# Patient Record
Sex: MALE | Race: BLACK OR AFRICAN AMERICAN | NOT HISPANIC OR LATINO | URBAN - METROPOLITAN AREA
[De-identification: names, ages, dates, MRNs, and addresses within clinical notes are randomized per-mention and may not be internally consistent; named-entity substitution may affect disease eponyms.]

---

## 2021-05-21 ENCOUNTER — EMERGENCY (EMERGENCY)
Facility: HOSPITAL | Age: 41
LOS: 1 days | Discharge: HOME | End: 2021-05-21
Attending: EMERGENCY MEDICINE | Admitting: EMERGENCY MEDICINE
Payer: MEDICAID

## 2021-05-21 VITALS
RESPIRATION RATE: 16 BRPM | OXYGEN SATURATION: 98 % | HEIGHT: 69 IN | HEART RATE: 99 BPM | TEMPERATURE: 98 F | SYSTOLIC BLOOD PRESSURE: 117 MMHG | DIASTOLIC BLOOD PRESSURE: 79 MMHG

## 2021-05-21 PROCEDURE — 99284 EMERGENCY DEPT VISIT MOD MDM: CPT

## 2021-05-21 RX ORDER — PANTOPRAZOLE SODIUM 20 MG/1
80 TABLET, DELAYED RELEASE ORAL ONCE
Refills: 0 | Status: COMPLETED | OUTPATIENT
Start: 2021-05-21 | End: 2021-05-21

## 2021-05-21 RX ORDER — SODIUM CHLORIDE 9 MG/ML
1000 INJECTION INTRAMUSCULAR; INTRAVENOUS; SUBCUTANEOUS ONCE
Refills: 0 | Status: COMPLETED | OUTPATIENT
Start: 2021-05-21 | End: 2021-05-21

## 2021-05-21 RX ADMIN — SODIUM CHLORIDE 1000 MILLILITER(S): 9 INJECTION INTRAMUSCULAR; INTRAVENOUS; SUBCUTANEOUS at 23:37

## 2021-05-21 RX ADMIN — PANTOPRAZOLE SODIUM 80 MILLIGRAM(S): 20 TABLET, DELAYED RELEASE ORAL at 23:37

## 2021-05-21 NOTE — ED PROVIDER NOTE - PATIENT PORTAL LINK FT
You can access the FollowMyHealth Patient Portal offered by [facility] by registering at the following website: [email or web address]. By joining [employer]’s FollowMyHealth portal, you will also be able to view your health information using other applications (apps) compatible with our system.

## 2021-05-21 NOTE — ED ADULT TRIAGE NOTE - CHIEF COMPLAINT QUOTE
patient complaining of shortness of breath, pain to mid chest and generalized abdominal pain x 1 week with nausea and vomiting. was recently in ED, had echo stress. patient failed stress test and was unable to run more than 6 minutes so was sent for cath where they did not find anything abnormal. patient continue to have same symptoms that is unresolved. no sick contacts, had covid test x 3 days ago which was negative. patient complaining of shortness of breath, pain to mid chest and generalized abdominal pain x 1 week with nausea and vomiting. was recently in ED, had echo stress. patient failed stress test and was unable to run more than 6 minutes so was sent for cath where they did not find anything abnormal. patient continue to have same symptoms that is unresolved. no sick contacts, had covid test x 3 days ago which was negative.     patient went to bathroom in ED, vomited, had some blood tinge vomit in toilet.

## 2021-05-21 NOTE — ED ADULT NURSE NOTE - CHIEF COMPLAINT QUOTE
patient complaining of shortness of breath, pain to mid chest and generalized abdominal pain x 1 week with nausea and vomiting. was recently in ED, had echo stress. patient failed stress test and was unable to run more than 6 minutes so was sent for cath where they did not find anything abnormal. patient continue to have same symptoms that is unresolved. no sick contacts, had covid test x 3 days ago which was negative.     patient went to bathroom in ED, vomited, had some blood tinge vomit in toilet.

## 2021-05-21 NOTE — ED PROVIDER NOTE - CLINICAL SUMMARY MEDICAL DECISION MAKING FREE TEXT BOX
40M w n/v, now w/ hematemesis, sob, s/p negative echo and cath 2 days ago, hyperglycemic, r/o dka, no abdominal tenderness, will hold ct for now, tba for hematemesis and GI evaluation, has not vomited in room here, vss, no cirrhotic features or h/o etoh abuse as per patient

## 2021-05-21 NOTE — ED PROVIDER NOTE - PROGRESS NOTE DETAILS
Ford, PGY2 - pt persistently nauseas, IVF and reglan ordered Ford, PGY2 - pt reassessed, states feels better denies nausea or further vomiting. discussed results, plan for f/u PMD continue metformin as prescribed pt verbalized understanding, agrees with plan, all questions answered

## 2021-05-21 NOTE — ED ADULT NURSE NOTE - OBJECTIVE STATEMENT
Pt received in room 4. Pt A&Ox4, pmhx HTN, DM newly on metformin, c/o SOB, chest heaviness, generalized abd pain, nausea and multiple episodes of vomiting. Pt states he was recently admitted to the hospital for similar symptoms and had a cardiac cath which was negative. Pt states since he has been discharged he has had worsening SOB on exertion, and nausea with vomiting (possibly some blood). Pt denies any ha, dizziness, diarrhea, fevers/chills. Respirations even and unlabored, no accessory muscle use, pt able to speak in full sentences. Pt in normal sinus rhythm on cardiac monitor. 20G placed to L AC, labs sent. Stretcher in lowest position, side rails up, call bell within reach.

## 2021-05-21 NOTE — ED PROVIDER NOTE - NSFOLLOWUPCLINICS_GEN_ALL_ED_FT
A Gastroenterologist  Gastroenterology  .  [place]   Phone:   Fax:     [facility] - Primary Care  Primary Care  [address] [place] [address]  Phone: [phone]  Fax:     [facility]  Gastroenterology  [address], [address]  [place] [address]  Phone: [phone]  Fax:

## 2021-05-21 NOTE — ED PROVIDER NOTE - NS ED ROS FT
General: denies fever, chills  HENT: denies nasal congestion, rhinorrhea  CV: denies chest pain, palpitations  Resp: + difficulty breathing, denies cough  Abdominal: + nausea, + vomiting, + hematemesis   MSK: denies muscle aches, leg swelling  Neuro: denies headaches, numbness, tingling  Skin: denies rashes, bruises  Heme: denies petechia, abnormal bleeding

## 2021-05-21 NOTE — ED PROVIDER NOTE - OBJECTIVE STATEMENT
40M w/ h/o T2DM on metformin presents w/ 1 week of nausea, sob, vomiting. States he went to an OSH where he was unable to tolerate a stress test but had a negative cath and echo. States symptoms have persisted prompting ED visit. States he has been vomiting blood today x3. Denies h/o etoh use or cirrhosis. Denies rectal bleed or dark stools. denies h/o dka

## 2021-05-21 NOTE — ED PROVIDER NOTE - NSFOLLOWUPINSTRUCTIONS_ED_ALL_ED_FT
Rest and stay well hydrated.   Continue your metformin as prescribed.   Take reglan 10mg every 8 hours as needed for nausea / vomiting.   Follow-up with your PMD in 3-5 days for recheck.   Return to the ED for any further vomiting, fever, difficulty breathing, chest pain, abdominal pain or any new concerning symptoms.

## 2021-05-21 NOTE — ED PROVIDER NOTE - ATTENDING CONTRIBUTION TO CARE
I have seen and examined the patient on the patient´s visit date. I have reviewed the note written by Albaro Bautista MD, on that visit day. I have supervised and participated as necessary in the performance of procedures indicated for patient management and was available at all phases of the patient´s visit when needed. We discussed the history, physical exam findings, management plan, and  medical decision making. I have made my additions, exceptions, and revisions within the chart and I agree with H and P as documented in its entirety. The data and my interpretation of any data collected from labs, interventions and imaging appear below as well as my independent medical decision making and considerations.    The patient is a 40y Male who has a past medical and surgery history of  DM/DKA Hypertension PTED with Chest pain nausea and vomiting as described      PTED with   Vital Signs Last 24 Hrs  T(F): 98.4 HR: 96 BP: 137/91 RR: 16 SpO2: 98% (21 May 2021 22:04)   PE: as described; my additions and exceptions are noted in the chart    DATA:  EKG: pending at time of evaluation  LAB: Pending at time of evaluation            IMPRESSION/RISK:  Dx=  Differential includes but not limited to conditions listed in order of most possible first:   Consideration include  Plan I have seen and examined the patient on the patient´s visit date. I have reviewed the note written by Albaro Bautista MD, on that visit day. I have supervised and participated as necessary in the performance of procedures indicated for patient management and was available at all phases of the patient´s visit when needed. We discussed the history, physical exam findings, management plan, and  medical decision making. I have made my additions, exceptions, and revisions within the chart and I agree with H and P as documented in its entirety. The data and my interpretation of any data collected from labs, interventions and imaging appear below as well as my independent medical decision making and considerations.    The patient is a 40y Male who has a past medical and surgery history of  DM/DKA Hypertension PTED with Chest pain nausea and vomiting as described    Vital Signs Last 24 Hrs  T(F): 98.4 HR: 96 BP: 137/91 RR: 16 SpO2: 98% (21 May 2021 22:04)   PE: as described; my additions and exceptions are noted in the chart    DATA:  EKG: pending at time of evaluation  LAB: Pending at time of evaluation      IMPRESSION/RISK:  Dx= negative cardiac workup in diabetic patient  Consideration include doubt PE; most probably gastroparesis dka unlikely given patients benign clinical exam and no stigmata of infection; pattern of sporadic symptoms makes it unikely as well  Plan  trop  ekg  labs   symptomatic tx reglan and H2/ppi   if response favorable d/c with same

## 2021-05-22 VITALS
SYSTOLIC BLOOD PRESSURE: 142 MMHG | HEART RATE: 94 BPM | RESPIRATION RATE: 18 BRPM | DIASTOLIC BLOOD PRESSURE: 96 MMHG | OXYGEN SATURATION: 100 %

## 2021-05-22 LAB
ALBUMIN SERPL ELPH-MCNC: 4.3 G/DL — SIGNIFICANT CHANGE UP (ref 3.3–5)
ALP SERPL-CCNC: 64 U/L — SIGNIFICANT CHANGE UP (ref 40–120)
ALT FLD-CCNC: 11 U/L — SIGNIFICANT CHANGE UP (ref 4–41)
ANION GAP SERPL CALC-SCNC: 13 MMOL/L — SIGNIFICANT CHANGE UP (ref 7–14)
APPEARANCE UR: CLEAR — SIGNIFICANT CHANGE UP
APTT BLD: 32.8 SEC — SIGNIFICANT CHANGE UP (ref 27–36.3)
AST SERPL-CCNC: 15 U/L — SIGNIFICANT CHANGE UP (ref 4–40)
B-OH-BUTYR SERPL-SCNC: 1.9 MMOL/L — HIGH (ref 0–0.4)
BASOPHILS # BLD AUTO: 0.02 K/UL — SIGNIFICANT CHANGE UP (ref 0–0.2)
BASOPHILS NFR BLD AUTO: 0.2 % — SIGNIFICANT CHANGE UP (ref 0–2)
BILIRUB SERPL-MCNC: 0.6 MG/DL — SIGNIFICANT CHANGE UP (ref 0.2–1.2)
BILIRUB UR-MCNC: NEGATIVE — SIGNIFICANT CHANGE UP
BLD GP AB SCN SERPL QL: NEGATIVE — SIGNIFICANT CHANGE UP
BLOOD GAS VENOUS COMPREHENSIVE RESULT: SIGNIFICANT CHANGE UP
BLOOD GAS VENOUS COMPREHENSIVE RESULT: SIGNIFICANT CHANGE UP
BUN SERPL-MCNC: 13 MG/DL — SIGNIFICANT CHANGE UP (ref 7–23)
CALCIUM SERPL-MCNC: 10 MG/DL — SIGNIFICANT CHANGE UP (ref 8.4–10.5)
CHLORIDE SERPL-SCNC: 98 MMOL/L — SIGNIFICANT CHANGE UP (ref 98–107)
CO2 SERPL-SCNC: 25 MMOL/L — SIGNIFICANT CHANGE UP (ref 22–31)
COLOR SPEC: SIGNIFICANT CHANGE UP
CREAT SERPL-MCNC: 0.87 MG/DL — SIGNIFICANT CHANGE UP (ref 0.5–1.3)
DIFF PNL FLD: NEGATIVE — SIGNIFICANT CHANGE UP
EOSINOPHIL # BLD AUTO: 0.02 K/UL — SIGNIFICANT CHANGE UP (ref 0–0.5)
EOSINOPHIL NFR BLD AUTO: 0.2 % — SIGNIFICANT CHANGE UP (ref 0–6)
GLUCOSE SERPL-MCNC: 287 MG/DL — HIGH (ref 70–99)
GLUCOSE UR QL: ABNORMAL
HCT VFR BLD CALC: 37.4 % — LOW (ref 39–50)
HGB BLD-MCNC: 12 G/DL — LOW (ref 13–17)
IANC: 7.27 K/UL — SIGNIFICANT CHANGE UP (ref 1.5–8.5)
IMM GRANULOCYTES NFR BLD AUTO: 0.3 % — SIGNIFICANT CHANGE UP (ref 0–1.5)
INR BLD: 1.11 RATIO — SIGNIFICANT CHANGE UP (ref 0.88–1.16)
KETONES UR-MCNC: ABNORMAL
LEUKOCYTE ESTERASE UR-ACNC: NEGATIVE — SIGNIFICANT CHANGE UP
LYMPHOCYTES # BLD AUTO: 19.9 % — SIGNIFICANT CHANGE UP (ref 13–44)
LYMPHOCYTES # BLD AUTO: 2.03 K/UL — SIGNIFICANT CHANGE UP (ref 1–3.3)
MCHC RBC-ENTMCNC: 25.5 PG — LOW (ref 27–34)
MCHC RBC-ENTMCNC: 32.1 GM/DL — SIGNIFICANT CHANGE UP (ref 32–36)
MCV RBC AUTO: 79.4 FL — LOW (ref 80–100)
MONOCYTES # BLD AUTO: 0.84 K/UL — SIGNIFICANT CHANGE UP (ref 0–0.9)
MONOCYTES NFR BLD AUTO: 8.2 % — SIGNIFICANT CHANGE UP (ref 2–14)
NEUTROPHILS # BLD AUTO: 7.27 K/UL — SIGNIFICANT CHANGE UP (ref 1.8–7.4)
NEUTROPHILS NFR BLD AUTO: 71.2 % — SIGNIFICANT CHANGE UP (ref 43–77)
NITRITE UR-MCNC: NEGATIVE — SIGNIFICANT CHANGE UP
NRBC # BLD: 0 /100 WBCS — SIGNIFICANT CHANGE UP
NRBC # FLD: 0 K/UL — SIGNIFICANT CHANGE UP
PH UR: 7 — SIGNIFICANT CHANGE UP (ref 5–8)
PLATELET # BLD AUTO: 330 K/UL — SIGNIFICANT CHANGE UP (ref 150–400)
POTASSIUM SERPL-MCNC: 5.1 MMOL/L — SIGNIFICANT CHANGE UP (ref 3.5–5.3)
POTASSIUM SERPL-SCNC: 5.1 MMOL/L — SIGNIFICANT CHANGE UP (ref 3.5–5.3)
PROT SERPL-MCNC: 7.3 G/DL — SIGNIFICANT CHANGE UP (ref 6–8.3)
PROT UR-MCNC: ABNORMAL
PROTHROM AB SERPL-ACNC: 12.6 SEC — SIGNIFICANT CHANGE UP (ref 10.6–13.6)
RBC # BLD: 4.71 M/UL — SIGNIFICANT CHANGE UP (ref 4.2–5.8)
RBC # FLD: 12.5 % — SIGNIFICANT CHANGE UP (ref 10.3–14.5)
RH IG SCN BLD-IMP: POSITIVE — SIGNIFICANT CHANGE UP
SARS-COV-2 RNA SPEC QL NAA+PROBE: SIGNIFICANT CHANGE UP
SODIUM SERPL-SCNC: 136 MMOL/L — SIGNIFICANT CHANGE UP (ref 135–145)
SP GR SPEC: 1.02 — SIGNIFICANT CHANGE UP (ref 1.01–1.02)
UROBILINOGEN FLD QL: SIGNIFICANT CHANGE UP
WBC # BLD: 10.21 K/UL — SIGNIFICANT CHANGE UP (ref 3.8–10.5)
WBC # FLD AUTO: 10.21 K/UL — SIGNIFICANT CHANGE UP (ref 3.8–10.5)

## 2021-05-22 PROCEDURE — 71046 X-RAY EXAM CHEST 2 VIEWS: CPT | Mod: 26

## 2021-05-22 RX ORDER — SODIUM CHLORIDE 9 MG/ML
1000 INJECTION INTRAMUSCULAR; INTRAVENOUS; SUBCUTANEOUS ONCE
Refills: 0 | Status: COMPLETED | OUTPATIENT
Start: 2021-05-22 | End: 2021-05-22

## 2021-05-22 RX ORDER — ONDANSETRON 8 MG/1
4 TABLET, FILM COATED ORAL ONCE
Refills: 0 | Status: COMPLETED | OUTPATIENT
Start: 2021-05-22 | End: 2021-05-22

## 2021-05-22 RX ORDER — METOCLOPRAMIDE HCL 10 MG
10 TABLET ORAL ONCE
Refills: 0 | Status: COMPLETED | OUTPATIENT
Start: 2021-05-22 | End: 2021-05-22

## 2021-05-22 RX ADMIN — Medication 10 MILLIGRAM(S): at 02:11

## 2021-05-22 RX ADMIN — ONDANSETRON 4 MILLIGRAM(S): 8 TABLET, FILM COATED ORAL at 01:54

## 2021-05-22 RX ADMIN — SODIUM CHLORIDE 1000 MILLILITER(S): 9 INJECTION INTRAMUSCULAR; INTRAVENOUS; SUBCUTANEOUS at 02:11

## 2021-05-23 LAB
CULTURE RESULTS: SIGNIFICANT CHANGE UP
SPECIMEN SOURCE: SIGNIFICANT CHANGE UP

## 2023-01-10 NOTE — ED PROVIDER NOTE - PMH
Nutrition Note      Consult for HF diet education. Pt unavailable during attempted RD visit. Will put HF Diet Education Materials in pt's discharge notes. Will follow-up at LOS if  still inpatient.         Electronically signed by Clement Pang RD on 1/10/23 at 3:47 PM EST    Contact: Clement Pang, [address]:    [place]: [phone]  Office:  [phone] Hypertension

## 2025-06-15 ENCOUNTER — INPATIENT (INPATIENT)
Facility: HOSPITAL | Age: 45
LOS: 2 days | Discharge: HOME | End: 2025-06-18
Attending: STUDENT IN AN ORGANIZED HEALTH CARE EDUCATION/TRAINING PROGRAM | Admitting: STUDENT IN AN ORGANIZED HEALTH CARE EDUCATION/TRAINING PROGRAM
Payer: COMMERCIAL

## 2025-06-15 VITALS
HEIGHT: 68 IN | TEMPERATURE: 98 F | SYSTOLIC BLOOD PRESSURE: 99 MMHG | RESPIRATION RATE: 16 BRPM | HEART RATE: 99 BPM | OXYGEN SATURATION: 100 % | DIASTOLIC BLOOD PRESSURE: 79 MMHG | WEIGHT: 210.1 LBS

## 2025-06-15 LAB
ALBUMIN SERPL ELPH-MCNC: 3.6 G/DL — SIGNIFICANT CHANGE UP (ref 3.3–5)
ALP SERPL-CCNC: 87 U/L — SIGNIFICANT CHANGE UP (ref 40–120)
ALT FLD-CCNC: 19 U/L — SIGNIFICANT CHANGE UP (ref 12–78)
ANION GAP SERPL CALC-SCNC: 11 MMOL/L — SIGNIFICANT CHANGE UP (ref 5–17)
ANION GAP SERPL CALC-SCNC: 17 MMOL/L — SIGNIFICANT CHANGE UP (ref 5–17)
ANION GAP SERPL CALC-SCNC: 18 MMOL/L — HIGH (ref 5–17)
APPEARANCE UR: CLEAR — SIGNIFICANT CHANGE UP
APTT BLD: 27 SEC — SIGNIFICANT CHANGE UP (ref 26.1–36.8)
AST SERPL-CCNC: 12 U/L — LOW (ref 15–37)
BACTERIA # UR AUTO: NEGATIVE /HPF — SIGNIFICANT CHANGE UP
BASE EXCESS BLDV CALC-SCNC: -10 MMOL/L — LOW (ref -2–3)
BASE EXCESS BLDV CALC-SCNC: -7.7 MMOL/L — LOW (ref -2–3)
BASOPHILS # BLD AUTO: 0.02 K/UL — SIGNIFICANT CHANGE UP (ref 0–0.2)
BASOPHILS NFR BLD AUTO: 0.1 % — SIGNIFICANT CHANGE UP (ref 0–2)
BILIRUB SERPL-MCNC: 1.3 MG/DL — HIGH (ref 0.2–1.2)
BILIRUB UR-MCNC: NEGATIVE — SIGNIFICANT CHANGE UP
BLOOD GAS COMMENTS, VENOUS: SIGNIFICANT CHANGE UP
BLOOD GAS COMMENTS, VENOUS: SIGNIFICANT CHANGE UP
BUN SERPL-MCNC: 42 MG/DL — HIGH (ref 7–23)
BUN SERPL-MCNC: 49 MG/DL — HIGH (ref 7–23)
BUN SERPL-MCNC: 55 MG/DL — HIGH (ref 7–23)
CALCIUM SERPL-MCNC: 8.2 MG/DL — LOW (ref 8.5–10.1)
CALCIUM SERPL-MCNC: 8.7 MG/DL — SIGNIFICANT CHANGE UP (ref 8.5–10.1)
CALCIUM SERPL-MCNC: 9 MG/DL — SIGNIFICANT CHANGE UP (ref 8.5–10.1)
CHLORIDE BLDV-SCNC: 108 MMOL/L — HIGH (ref 98–107)
CHLORIDE BLDV-SCNC: 86 MMOL/L — LOW (ref 98–107)
CHLORIDE SERPL-SCNC: 100 MMOL/L — SIGNIFICANT CHANGE UP (ref 96–108)
CHLORIDE SERPL-SCNC: 89 MMOL/L — LOW (ref 96–108)
CHLORIDE SERPL-SCNC: 96 MMOL/L — SIGNIFICANT CHANGE UP (ref 96–108)
CO2 BLDV-SCNC: 17 MMOL/L — LOW (ref 22–26)
CO2 BLDV-SCNC: 17 MMOL/L — LOW (ref 22–26)
CO2 SERPL-SCNC: 15 MMOL/L — LOW (ref 22–31)
CO2 SERPL-SCNC: 16 MMOL/L — LOW (ref 22–31)
CO2 SERPL-SCNC: 22 MMOL/L — SIGNIFICANT CHANGE UP (ref 22–31)
COLOR SPEC: YELLOW — SIGNIFICANT CHANGE UP
CREAT SERPL-MCNC: 1.53 MG/DL — HIGH (ref 0.5–1.3)
CREAT SERPL-MCNC: 1.83 MG/DL — HIGH (ref 0.5–1.3)
CREAT SERPL-MCNC: 2.09 MG/DL — HIGH (ref 0.5–1.3)
DIFF PNL FLD: NEGATIVE — SIGNIFICANT CHANGE UP
EGFR: 40 ML/MIN/1.73M2 — LOW
EGFR: 40 ML/MIN/1.73M2 — LOW
EGFR: 47 ML/MIN/1.73M2 — LOW
EGFR: 47 ML/MIN/1.73M2 — LOW
EGFR: 58 ML/MIN/1.73M2 — LOW
EGFR: 58 ML/MIN/1.73M2 — LOW
EOSINOPHIL # BLD AUTO: 0.01 K/UL — SIGNIFICANT CHANGE UP (ref 0–0.5)
EOSINOPHIL NFR BLD AUTO: 0.1 % — SIGNIFICANT CHANGE UP (ref 0–6)
FLUAV AG NPH QL: SIGNIFICANT CHANGE UP
FLUBV AG NPH QL: SIGNIFICANT CHANGE UP
GAS PNL BLDV: 121 MMOL/L — LOW (ref 136–145)
GAS PNL BLDV: 134 MMOL/L — LOW (ref 136–145)
GAS PNL BLDV: SIGNIFICANT CHANGE UP
GLUCOSE BLDC GLUCOMTR-MCNC: 167 MG/DL — HIGH (ref 70–99)
GLUCOSE BLDC GLUCOMTR-MCNC: 208 MG/DL — HIGH (ref 70–99)
GLUCOSE BLDC GLUCOMTR-MCNC: 216 MG/DL — HIGH (ref 70–99)
GLUCOSE BLDC GLUCOMTR-MCNC: 226 MG/DL — HIGH (ref 70–99)
GLUCOSE BLDC GLUCOMTR-MCNC: 300 MG/DL — HIGH (ref 70–99)
GLUCOSE BLDC GLUCOMTR-MCNC: 343 MG/DL — HIGH (ref 70–99)
GLUCOSE BLDC GLUCOMTR-MCNC: 364 MG/DL — HIGH (ref 70–99)
GLUCOSE BLDV-MCNC: 144 MG/DL — HIGH (ref 65–95)
GLUCOSE BLDV-MCNC: SIGNIFICANT CHANGE UP MG/DL (ref 65–95)
GLUCOSE SERPL-MCNC: 183 MG/DL — HIGH (ref 70–99)
GLUCOSE SERPL-MCNC: 382 MG/DL — HIGH (ref 70–99)
GLUCOSE SERPL-MCNC: 589 MG/DL — CRITICAL HIGH (ref 70–99)
GLUCOSE UR QL: >=1000 MG/DL
HCO3 BLDV-SCNC: 16 MMOL/L — LOW (ref 22–28)
HCO3 BLDV-SCNC: 16 MMOL/L — LOW (ref 22–28)
HCT VFR BLD CALC: 48.8 % — SIGNIFICANT CHANGE UP (ref 39–50)
HCT VFR BLDA CALC: 40 % — SIGNIFICANT CHANGE UP (ref 37–47)
HCT VFR BLDA CALC: 48 % — HIGH (ref 37–47)
HGB BLD CALC-MCNC: 13.3 G/DL — SIGNIFICANT CHANGE UP (ref 12.6–17.4)
HGB BLD CALC-MCNC: 16 G/DL — SIGNIFICANT CHANGE UP (ref 12.6–17.4)
HGB BLD-MCNC: 15.7 G/DL — SIGNIFICANT CHANGE UP (ref 13–17)
HOROWITZ INDEX BLDV+IHG-RTO: 21 — SIGNIFICANT CHANGE UP
IMM GRANULOCYTES NFR BLD AUTO: 1.1 % — HIGH (ref 0–0.9)
INR BLD: 0.97 RATIO — SIGNIFICANT CHANGE UP (ref 0.85–1.16)
KETONES UR QL: 80 MG/DL
LACTATE BLDV-MCNC: 1.7 MMOL/L — HIGH (ref 0.56–1.39)
LACTATE BLDV-MCNC: 3.6 MMOL/L — HIGH (ref 0.56–1.39)
LACTATE SERPL-SCNC: 3.3 MMOL/L — HIGH (ref 0.7–2)
LACTATE SERPL-SCNC: 3.4 MMOL/L — HIGH (ref 0.7–2)
LEUKOCYTE ESTERASE UR-ACNC: NEGATIVE — SIGNIFICANT CHANGE UP
LIDOCAIN IGE QN: 26 U/L — SIGNIFICANT CHANGE UP (ref 13–75)
LYMPHOCYTES # BLD AUTO: 18.3 % — SIGNIFICANT CHANGE UP (ref 13–44)
LYMPHOCYTES # BLD AUTO: 2.55 K/UL — SIGNIFICANT CHANGE UP (ref 1–3.3)
MAGNESIUM SERPL-MCNC: 3.1 MG/DL — HIGH (ref 1.6–2.6)
MCHC RBC-ENTMCNC: 26.1 PG — LOW (ref 27–34)
MCHC RBC-ENTMCNC: 32.2 G/DL — SIGNIFICANT CHANGE UP (ref 32–36)
MCV RBC AUTO: 81.2 FL — SIGNIFICANT CHANGE UP (ref 80–100)
MONOCYTES # BLD AUTO: 0.93 K/UL — HIGH (ref 0–0.9)
MONOCYTES NFR BLD AUTO: 6.7 % — SIGNIFICANT CHANGE UP (ref 2–14)
NEUTROPHILS # BLD AUTO: 10.25 K/UL — HIGH (ref 1.8–7.4)
NEUTROPHILS NFR BLD AUTO: 73.7 % — SIGNIFICANT CHANGE UP (ref 43–77)
NITRITE UR-MCNC: NEGATIVE — SIGNIFICANT CHANGE UP
NRBC BLD AUTO-RTO: 0 /100 WBCS — SIGNIFICANT CHANGE UP (ref 0–0)
PCO2 BLDV: 28 MMHG — LOW (ref 42–55)
PCO2 BLDV: 36 MMHG — LOW (ref 42–55)
PH BLDV: 7.26 — LOW (ref 7.32–7.43)
PH BLDV: 7.37 — SIGNIFICANT CHANGE UP (ref 7.32–7.43)
PH UR: 6 — SIGNIFICANT CHANGE UP (ref 5–8)
PLATELET # BLD AUTO: 419 K/UL — HIGH (ref 150–400)
PO2 BLDV: 21 MMHG — LOW (ref 25–45)
PO2 BLDV: 26 MMHG — SIGNIFICANT CHANGE UP (ref 25–45)
POTASSIUM BLDV-SCNC: 2.7 MMOL/L — CRITICAL LOW (ref 3.5–5.1)
POTASSIUM BLDV-SCNC: 5.1 MMOL/L — SIGNIFICANT CHANGE UP (ref 3.5–5.1)
POTASSIUM SERPL-MCNC: 3.6 MMOL/L — SIGNIFICANT CHANGE UP (ref 3.5–5.3)
POTASSIUM SERPL-MCNC: 4.1 MMOL/L — SIGNIFICANT CHANGE UP (ref 3.5–5.3)
POTASSIUM SERPL-MCNC: 4.9 MMOL/L — SIGNIFICANT CHANGE UP (ref 3.5–5.3)
POTASSIUM SERPL-SCNC: 3.6 MMOL/L — SIGNIFICANT CHANGE UP (ref 3.5–5.3)
POTASSIUM SERPL-SCNC: 4.1 MMOL/L — SIGNIFICANT CHANGE UP (ref 3.5–5.3)
POTASSIUM SERPL-SCNC: 4.9 MMOL/L — SIGNIFICANT CHANGE UP (ref 3.5–5.3)
PROT SERPL-MCNC: 8.2 GM/DL — SIGNIFICANT CHANGE UP (ref 6–8.3)
PROT UR-MCNC: 30 MG/DL
PROTHROM AB SERPL-ACNC: 11.2 SEC — SIGNIFICANT CHANGE UP (ref 9.9–13.4)
RBC # BLD: 6.01 M/UL — HIGH (ref 4.2–5.8)
RBC # FLD: 13.2 % — SIGNIFICANT CHANGE UP (ref 10.3–14.5)
RBC CASTS # UR COMP ASSIST: 1 /HPF — SIGNIFICANT CHANGE UP (ref 0–4)
RSV RNA NPH QL NAA+NON-PROBE: SIGNIFICANT CHANGE UP
SAO2 % BLDV: 33.4 % — LOW (ref 94–98)
SAO2 % BLDV: 44.5 % — LOW (ref 94–98)
SARS-COV-2 RNA SPEC QL NAA+PROBE: SIGNIFICANT CHANGE UP
SODIUM SERPL-SCNC: 122 MMOL/L — LOW (ref 135–145)
SODIUM SERPL-SCNC: 129 MMOL/L — LOW (ref 135–145)
SODIUM SERPL-SCNC: 133 MMOL/L — LOW (ref 135–145)
SOURCE RESPIRATORY: SIGNIFICANT CHANGE UP
SP GR SPEC: >1.03 — HIGH (ref 1–1.03)
UROBILINOGEN FLD QL: 0.2 MG/DL — SIGNIFICANT CHANGE UP (ref 0.2–1)
WBC # BLD: 13.92 K/UL — HIGH (ref 3.8–10.5)
WBC # FLD AUTO: 13.92 K/UL — HIGH (ref 3.8–10.5)
WBC UR QL: 3 /HPF — SIGNIFICANT CHANGE UP (ref 0–5)

## 2025-06-15 PROCEDURE — 99223 1ST HOSP IP/OBS HIGH 75: CPT

## 2025-06-15 PROCEDURE — 93010 ELECTROCARDIOGRAM REPORT: CPT

## 2025-06-15 PROCEDURE — 74176 CT ABD & PELVIS W/O CONTRAST: CPT | Mod: 26

## 2025-06-15 PROCEDURE — 71045 X-RAY EXAM CHEST 1 VIEW: CPT | Mod: 26

## 2025-06-15 PROCEDURE — 99285 EMERGENCY DEPT VISIT HI MDM: CPT

## 2025-06-15 RX ORDER — ONDANSETRON HCL/PF 4 MG/2 ML
4 VIAL (ML) INJECTION ONCE
Refills: 0 | Status: COMPLETED | OUTPATIENT
Start: 2025-06-15 | End: 2025-06-15

## 2025-06-15 RX ORDER — SODIUM CHLORIDE 9 G/1000ML
1000 INJECTION, SOLUTION INTRAVENOUS ONCE
Refills: 0 | Status: COMPLETED | OUTPATIENT
Start: 2025-06-15 | End: 2025-06-15

## 2025-06-15 RX ORDER — INSULIN LISPRO 100 U/ML
INJECTION, SOLUTION INTRAVENOUS; SUBCUTANEOUS
Refills: 0 | Status: DISCONTINUED | OUTPATIENT
Start: 2025-06-15 | End: 2025-06-18

## 2025-06-15 RX ORDER — INSULIN LISPRO 100 U/ML
7 INJECTION, SOLUTION INTRAVENOUS; SUBCUTANEOUS
Refills: 0 | Status: DISCONTINUED | OUTPATIENT
Start: 2025-06-16 | End: 2025-06-16

## 2025-06-15 RX ORDER — INSULIN GLARGINE-YFGN 100 [IU]/ML
35 INJECTION, SOLUTION SUBCUTANEOUS ONCE
Refills: 0 | Status: COMPLETED | OUTPATIENT
Start: 2025-06-15 | End: 2025-06-15

## 2025-06-15 RX ORDER — ENOXAPARIN SODIUM 100 MG/ML
40 INJECTION SUBCUTANEOUS EVERY 24 HOURS
Refills: 0 | Status: DISCONTINUED | OUTPATIENT
Start: 2025-06-15 | End: 2025-06-18

## 2025-06-15 RX ORDER — INSULIN GLARGINE-YFGN 100 [IU]/ML
35 INJECTION, SOLUTION SUBCUTANEOUS AT BEDTIME
Refills: 0 | Status: DISCONTINUED | OUTPATIENT
Start: 2025-06-16 | End: 2025-06-17

## 2025-06-15 RX ORDER — SODIUM CHLORIDE 9 G/1000ML
2100 INJECTION, SOLUTION INTRAVENOUS ONCE
Refills: 0 | Status: COMPLETED | OUTPATIENT
Start: 2025-06-15 | End: 2025-06-15

## 2025-06-15 RX ADMIN — SODIUM CHLORIDE 1000 MILLILITER(S): 9 INJECTION, SOLUTION INTRAVENOUS at 13:48

## 2025-06-15 RX ADMIN — SODIUM CHLORIDE 1000 MILLILITER(S): 9 INJECTION, SOLUTION INTRAVENOUS at 10:22

## 2025-06-15 RX ADMIN — INSULIN LISPRO 1: 100 INJECTION, SOLUTION INTRAVENOUS; SUBCUTANEOUS at 19:47

## 2025-06-15 RX ADMIN — Medication 10 UNIT(S)/HR: at 14:27

## 2025-06-15 RX ADMIN — Medication 10 UNIT(S): at 11:17

## 2025-06-15 RX ADMIN — SODIUM CHLORIDE 2100 MILLILITER(S): 9 INJECTION, SOLUTION INTRAVENOUS at 09:45

## 2025-06-15 RX ADMIN — Medication 100 MILLIEQUIVALENT(S): at 21:46

## 2025-06-15 RX ADMIN — Medication 4 MILLIGRAM(S): at 10:15

## 2025-06-15 RX ADMIN — SODIUM CHLORIDE 2100 MILLILITER(S): 9 INJECTION, SOLUTION INTRAVENOUS at 11:00

## 2025-06-15 RX ADMIN — SODIUM CHLORIDE 1000 MILLILITER(S): 9 INJECTION, SOLUTION INTRAVENOUS at 11:30

## 2025-06-15 RX ADMIN — Medication 1 APPLICATION(S): at 18:09

## 2025-06-15 RX ADMIN — Medication 4 MILLIGRAM(S): at 09:42

## 2025-06-15 RX ADMIN — Medication 40 MILLIEQUIVALENT(S): at 19:47

## 2025-06-15 RX ADMIN — INSULIN GLARGINE-YFGN 35 UNIT(S): 100 INJECTION, SOLUTION SUBCUTANEOUS at 18:08

## 2025-06-15 RX ADMIN — Medication 100 MILLIEQUIVALENT(S): at 20:45

## 2025-06-15 RX ADMIN — SODIUM CHLORIDE 1000 MILLILITER(S): 9 INJECTION, SOLUTION INTRAVENOUS at 15:00

## 2025-06-15 RX ADMIN — Medication 100 MILLIEQUIVALENT(S): at 19:47

## 2025-06-15 NOTE — H&P ADULT - NSHPLABSRESULTS_GEN_ALL_CORE
LABS:  cret                        15.7   13.92 )-----------( 419      ( 15 Ata 2025 09:35 )             48.8     06-15    129[L]  |  96  |  49[H]  ----------------------------<  382[H]  4.1   |  16[L]  |  1.83[H]    Ca    8.2[L]      15 Ata 2025 12:40  Mg     3.1     06-15    TPro  8.2  /  Alb  3.6  /  TBili  1.3[H]  /  DBili  x   /  AST  12[L]  /  ALT  19  /  AlkPhos  87  06-15    PT/INR - ( 15 Ata 2025 09:35 )   PT: 11.2 sec;   INR: 0.97 ratio         PTT - ( 15 Ata 2025 09:35 )  PTT:27.0 sec

## 2025-06-15 NOTE — ED ADULT NURSE NOTE - NSFALLRISKINTERV_ED_ALL_ED

## 2025-06-15 NOTE — ED PROVIDER NOTE - CLINICAL SUMMARY MEDICAL DECISION MAKING FREE TEXT BOX
44 years old male here c/o generalized weakness nausea vomiting diffused abdominal discomfort for two days Pt also sts he has not had used his insulin for past two days due to generalized weakness. Pt also admit marijuana use last was couple of days ago. Pt is alert and oriented x 3 no active vomiting not holding his abdomen/head/chest. abd is non distended no tender to palp. Labs. ivf are ordered.

## 2025-06-15 NOTE — ED PROVIDER NOTE - ENMT, MLM
Airway patent, Nasal mucosa clear. Mouth with + dry oral mucosa. Throat has no vesicles, no oropharyngeal exudates and uvula is midline.

## 2025-06-15 NOTE — ED ADULT NURSE NOTE - OBJECTIVE STATEMENT
Pt BIBEMS c/o weakness and vomiting x 2 days. Also endorsing dizziness, and abdominal pain. Pt reports he has pmh of cyclical vomiting, reports using marijuana 3 days ago. Also reports pmh of keto acidosis 1 year ago, states his symptoms feel similar to that time. PMH of DM2. Pt is awake and alert, a&ox4, VSS.

## 2025-06-15 NOTE — ED ADULT TRIAGE NOTE - WEIGHT IN KG
PT spiked fever of 102.4 , evaluated by ID , restarted on meropenem. Will send blood culture , get CXR 95.3

## 2025-06-15 NOTE — ED PROVIDER NOTE - NSICDXPASTMEDICALHX_GEN_ALL_CORE_FT
PAST MEDICAL HISTORY:  Cyclical vomiting     Diabetes     Gastric paresis     Hypertension     Marijuana use

## 2025-06-15 NOTE — ED ADULT TRIAGE NOTE - BSA (M2)
-- DO NOT REPLY / DO NOT REPLY ALL --  -- Message is from the [facility]--    COVID-19 Universal Screening: Negative    General Patient Message      Reason for Call: Patient states he is having sever muscle pain and only want to see the doctor but there are no appointment until Monday please call him back    Caller Information       Type Contact Phone    06/04/2020 07:59 AM Phone (Incoming) Patric Arroyo (Self) [phone] (M)          Alternative phone number: na    Turnaround time given to caller:   \"This message will be sent to [state Provider's name]. The clinical team will fulfill your request as soon as they review your message.\"     2.09

## 2025-06-15 NOTE — H&P ADULT - NS ATTEND AMEND GEN_ALL_CORE FT
Pt is a 43 yo BM with h/o HTN, DM complicated by gastroparesis presented to 2 to abdominal pain and N/V. Pt has not taken Insulin in the past 2 days due to not feeling well. In the ER pt found to be in DKA. ICU dx: 1) DKA 2) RYANNE    CVS: Can hold pt's antiHTN med  HEME: DVT prophylaxis with Lovenox  FEN: NPO/ Aggressive fluid resusc/ Follow lytes initially q 6 hrs  Endo: Titrate Insulin drip to AG and Glu as both normalize overlap with Lantus/ Follow FS initially q 1 hr  Renal: Cont IV hydration and follow BUN/Cr and UO  Social: Pt is a full code

## 2025-06-15 NOTE — ED PROVIDER NOTE - NONTENDER LOCATION
left upper quadrant/right upper quadrant/left lower quadrant/right lower quadrant/umbilical/suprapubic/left costovertebral angle/right costovertebral angle

## 2025-06-15 NOTE — H&P ADULT - HISTORY OF PRESENT ILLNESS
44 year old M with PMHx HTN, gastroparesis, marijuana use, DM2 presented to ED with abdominal pain, nausea, vomiting. Patient has not taken insulin in the past 2 days due to not feeling well. In ED found to have , pH 7.26, AG 18, HCO3 15 Lactate 3.3 and RYANNE. In ED patient given 3L IVF and 10u IVP insulin. CTAP: Distended bladder. Mildly enlarged prostate.Repeat labwork without large improvement. Accepted to ICU for further management of DKA.

## 2025-06-15 NOTE — ED PROVIDER NOTE - CONSTITUTIONAL, MLM
normal... Well appearing, awake, alert, oriented to person, place, time/situation and in no apparent distress. Speaking in clear full sentences no nasal flaring no shoulders retractions no diaphoresis, no active vomiting  not holding his head/chest/abdomen

## 2025-06-15 NOTE — ED ADULT TRIAGE NOTE - BEFAST ARM NUMBNESS
[facility] Emergency Department  [address]  [place] [address]  Phone: [phone]               January 3, 2020    Patient: Misty Zavala   YOB: 1985   Date of Visit: 1/3/2020       To Whom It May Concern:    Fanny Hodgkins was seen and treated in our emergency department on 1/3/2020.  He may return 1/6/2020      Sincerely,       Cindy Arredondo          Signature:__________________________________ No

## 2025-06-15 NOTE — PATIENT PROFILE ADULT - FUNCTIONAL ASSESSMENT - DAILY ACTIVITY 5.
Received completed colonoscopy questionnaire via [employer] and sent to Dr Ruggiero to review.  
Yes
4 = No assist / stand by assistance

## 2025-06-15 NOTE — PATIENT PROFILE ADULT - HOW PATIENT ADDRESSED, PROFILE
Power Clofazimine Counseling:  I discussed with the patient the risks of clofazimine including but not limited to skin and eye pigmentation, liver damage, nausea/vomiting, gastrointestinal bleeding and allergy.

## 2025-06-15 NOTE — H&P ADULT - ASSESSMENT
44 year old M with PMHx HTN, gastroparesis, marijuana use, DM2 presented to ED with abdominal pain, nausea, vomiting admitted to ICU with DKA 2/2 to insulin noncompliance.         # Neuro:  -A/Ox3  - ORALIA       #Resp:  -satting adequately , maintain sats>92%       #CV:  -HD stable without vasopressor requirements  - MAP goal>65    #GI:  - NPO     #Renal:  - fluids/IVL: s/p 3L IVF in ED.   - Will give another  1L bolus in ICU   - distended bladder on CTAP; monitor UO. may require yo   - replete lytes as appropriate     #ID:  - can monitor off abx for now   - UA negative  - FLU/COVID/RSV negative     #Heme:  - lovenox dvt prophylaxis     #Endo:  //DKA   - hx of DM2 has not taken insulin in two days   - s/p 10u IVP insulin  - will start insulin gtt   - Q1H FS   - A1C   - monitor BMP while on insulin gtt     Discussed with ICU attending Dr. Luke. Discussed with ED attending Dr. Garza

## 2025-06-16 LAB
A1C WITH ESTIMATED AVERAGE GLUCOSE RESULT: 12.7 % — HIGH (ref 4–5.6)
ALBUMIN SERPL ELPH-MCNC: 2.9 G/DL — LOW (ref 3.3–5)
ALP SERPL-CCNC: 60 U/L — SIGNIFICANT CHANGE UP (ref 40–120)
ALT FLD-CCNC: 15 U/L — SIGNIFICANT CHANGE UP (ref 12–78)
ANION GAP SERPL CALC-SCNC: 9 MMOL/L — SIGNIFICANT CHANGE UP (ref 5–17)
AST SERPL-CCNC: 14 U/L — LOW (ref 15–37)
BILIRUB SERPL-MCNC: 0.9 MG/DL — SIGNIFICANT CHANGE UP (ref 0.2–1.2)
BUN SERPL-MCNC: 44 MG/DL — HIGH (ref 7–23)
CALCIUM SERPL-MCNC: 8.4 MG/DL — LOW (ref 8.5–10.1)
CHLORIDE SERPL-SCNC: 103 MMOL/L — SIGNIFICANT CHANGE UP (ref 96–108)
CO2 SERPL-SCNC: 19 MMOL/L — LOW (ref 22–31)
CREAT SERPL-MCNC: 1.61 MG/DL — HIGH (ref 0.5–1.3)
EGFR: 54 ML/MIN/1.73M2 — LOW
EGFR: 54 ML/MIN/1.73M2 — LOW
ESTIMATED AVERAGE GLUCOSE: 318 MG/DL — HIGH (ref 68–114)
GLUCOSE BLDC GLUCOMTR-MCNC: 240 MG/DL — HIGH (ref 70–99)
GLUCOSE BLDC GLUCOMTR-MCNC: 249 MG/DL — HIGH (ref 70–99)
GLUCOSE BLDC GLUCOMTR-MCNC: 271 MG/DL — HIGH (ref 70–99)
GLUCOSE BLDC GLUCOMTR-MCNC: 368 MG/DL — HIGH (ref 70–99)
GLUCOSE SERPL-MCNC: 291 MG/DL — HIGH (ref 70–99)
HCT VFR BLD CALC: 39.4 % — SIGNIFICANT CHANGE UP (ref 39–50)
HGB BLD-MCNC: 12.8 G/DL — LOW (ref 13–17)
MAGNESIUM SERPL-MCNC: 2.7 MG/DL — HIGH (ref 1.6–2.6)
MCHC RBC-ENTMCNC: 26.2 PG — LOW (ref 27–34)
MCHC RBC-ENTMCNC: 32.5 G/DL — SIGNIFICANT CHANGE UP (ref 32–36)
MCV RBC AUTO: 80.6 FL — SIGNIFICANT CHANGE UP (ref 80–100)
NRBC BLD AUTO-RTO: 0 /100 WBCS — SIGNIFICANT CHANGE UP (ref 0–0)
PHOSPHATE SERPL-MCNC: 2.8 MG/DL — SIGNIFICANT CHANGE UP (ref 2.5–4.5)
PLATELET # BLD AUTO: 311 K/UL — SIGNIFICANT CHANGE UP (ref 150–400)
POTASSIUM SERPL-MCNC: 5.1 MMOL/L — SIGNIFICANT CHANGE UP (ref 3.5–5.3)
POTASSIUM SERPL-SCNC: 5.1 MMOL/L — SIGNIFICANT CHANGE UP (ref 3.5–5.3)
PROT SERPL-MCNC: 6.4 GM/DL — SIGNIFICANT CHANGE UP (ref 6–8.3)
RBC # BLD: 4.89 M/UL — SIGNIFICANT CHANGE UP (ref 4.2–5.8)
RBC # FLD: 13.4 % — SIGNIFICANT CHANGE UP (ref 10.3–14.5)
SODIUM SERPL-SCNC: 131 MMOL/L — LOW (ref 135–145)
WBC # BLD: 12.58 K/UL — HIGH (ref 3.8–10.5)
WBC # FLD AUTO: 12.58 K/UL — HIGH (ref 3.8–10.5)

## 2025-06-16 PROCEDURE — 99233 SBSQ HOSP IP/OBS HIGH 50: CPT

## 2025-06-16 RX ORDER — SODIUM CHLORIDE 9 G/1000ML
1000 INJECTION, SOLUTION INTRAVENOUS
Refills: 0 | Status: DISCONTINUED | OUTPATIENT
Start: 2025-06-16 | End: 2025-06-18

## 2025-06-16 RX ORDER — SODIUM CHLORIDE 9 G/1000ML
1000 INJECTION, SOLUTION INTRAVENOUS ONCE
Refills: 0 | Status: COMPLETED | OUTPATIENT
Start: 2025-06-16 | End: 2025-06-16

## 2025-06-16 RX ORDER — INSULIN LISPRO 100 U/ML
7 INJECTION, SOLUTION INTRAVENOUS; SUBCUTANEOUS
Refills: 0 | Status: DISCONTINUED | OUTPATIENT
Start: 2025-06-16 | End: 2025-06-17

## 2025-06-16 RX ADMIN — INSULIN LISPRO 3: 100 INJECTION, SOLUTION INTRAVENOUS; SUBCUTANEOUS at 08:19

## 2025-06-16 RX ADMIN — SODIUM CHLORIDE 75 MILLILITER(S): 9 INJECTION, SOLUTION INTRAVENOUS at 14:30

## 2025-06-16 RX ADMIN — SODIUM CHLORIDE 1000 MILLILITER(S): 9 INJECTION, SOLUTION INTRAVENOUS at 10:14

## 2025-06-16 RX ADMIN — ENOXAPARIN SODIUM 40 MILLIGRAM(S): 100 INJECTION SUBCUTANEOUS at 05:25

## 2025-06-16 RX ADMIN — INSULIN LISPRO 2: 100 INJECTION, SOLUTION INTRAVENOUS; SUBCUTANEOUS at 16:31

## 2025-06-16 RX ADMIN — INSULIN LISPRO 2: 100 INJECTION, SOLUTION INTRAVENOUS; SUBCUTANEOUS at 11:12

## 2025-06-16 RX ADMIN — INSULIN GLARGINE-YFGN 35 UNIT(S): 100 INJECTION, SOLUTION SUBCUTANEOUS at 21:23

## 2025-06-16 RX ADMIN — INSULIN LISPRO 7 UNIT(S): 100 INJECTION, SOLUTION INTRAVENOUS; SUBCUTANEOUS at 08:15

## 2025-06-16 RX ADMIN — SODIUM CHLORIDE 75 MILLILITER(S): 9 INJECTION, SOLUTION INTRAVENOUS at 09:31

## 2025-06-16 RX ADMIN — INSULIN LISPRO 7 UNIT(S): 100 INJECTION, SOLUTION INTRAVENOUS; SUBCUTANEOUS at 16:30

## 2025-06-16 RX ADMIN — Medication 1 APPLICATION(S): at 11:13

## 2025-06-16 RX ADMIN — INSULIN LISPRO 7 UNIT(S): 100 INJECTION, SOLUTION INTRAVENOUS; SUBCUTANEOUS at 11:13

## 2025-06-16 NOTE — PROGRESS NOTE ADULT - ASSESSMENT
Pt is a 45 yo BM with h/o HTN, DM complicated by gastroparesis presented to 2 to abdominal pain and N/V. Pt did not take Insulin 2 days PTA due to not feeling well. In the ER pt found to be in DKA. ICU dx: 1) DKA 2) RYANNE    CVS: Can hold pt's antiHTN med  HEME: DVT prophylaxis with Lovenox  FEN: ADA po diet/ Give another 1 L LR bolus this am  Endo: Adjust Lantus (may need to increase Lantus) + Lispro to FS  Renal: Cont IV hydration and follow BUN/Cr and UO  Social: Pt is a full code/ OOB -> chair -> ambulate/ May transfer to [facility]

## 2025-06-16 NOTE — CHART NOTE - NSCHARTNOTEFT_GEN_A_CORE
MICU DOWN GRADE NOTE  Accepting MD: dr bradshaw     Patient is a 44y old  Male who presents with a chief complaint of DKA (15 Ata 2025 13:43)    HPI:  44 year old M with PMHx HTN, gastroparesis, marijuana use, DM2 presented to ED with abdominal pain, nausea, vomiting. Patient has not taken insulin in the past 2 days due to not feeling well. In ED found to have , pH 7.26, AG 18, HCO3 15 Lactate 3.3 and RYANNE. In ED patient given 3L IVF and 10u IVP insulin. CTAP: Distended bladder. Mildly enlarged prostate.Repeat labwork without large improvement. Accepted to ICU for further management of DKA.     INTERVAL HPI/OVERNIGHT EVENTS: pt's mild dka improved and transitioned to basal bolus therapy now tolerating diet. Pt stable for downgrade to medicine.           MEDICATIONS:  chlorhexidine 2% Cloths 1 Application(s) Topical <User Schedule>  enoxaparin Injectable 40 milliGRAM(s) SubCutaneous every 24 hours  insulin glargine Injectable (LANTUS) 35 Unit(s) SubCutaneous at bedtime  insulin lispro (ADMELOG) corrective regimen sliding scale   SubCutaneous three times a day before meals  insulin lispro Injectable (ADMELOG) 7 Unit(s) SubCutaneous two times a day before meals      T(C): 36.4 (06-16-25 @ 07:27), Max: 36.9 (06-15-25 @ 17:01)  HR: 85 (06-16-25 @ 07:00) (76 - 103)  BP: 152/103 (06-16-25 @ 07:00) (98/65 - 152/103)  RR: 16 (06-16-25 @ 07:00) (12 - 18)  SpO2: 100% (06-16-25 @ 07:00) (99% - 100%)  Wt(kg): --Vital Signs Last 24 Hrs  T(C): 36.4 (16 Jun 2025 07:27), Max: 36.9 (15 Ata 2025 17:01)  T(F): 97.6 (16 Jun 2025 07:27), Max: 98.5 (15 Ata 2025 17:01)  HR: 85 (16 Jun 2025 07:00) (76 - 103)  BP: 152/103 (16 Jun 2025 07:00) (98/65 - 152/103)  BP(mean): 117 (16 Jun 2025 07:00) (75 - 118)  RR: 16 (16 Jun 2025 07:00) (12 - 18)  SpO2: 100% (16 Jun 2025 07:00) (99% - 100%)    Parameters below as of 15 Ata 2025 19:30  Patient On (Oxygen Delivery Method): room air          Consultant(s) Notes Reviewed:  [x ] YES  [ ] NO  Care Discussed with Consultants/Other Providers [ x] YES  [ ] NO    LABS:                        12.8   12.58 )-----------( 311      ( 16 Jun 2025 03:14 )             39.4     06-16    131[L]  |  103  |  44[H]  ----------------------------<  291[H]  5.1   |  19[L]  |  1.61[H]    Ca    8.4[L]      16 Jun 2025 03:14  Phos  2.8     06-16  Mg     2.7     06-16    TPro  6.4  /  Alb  2.9[L]  /  TBili  0.9  /  DBili  x   /  AST  14[L]  /  ALT  15  /  AlkPhos  60  06-16    PT/INR - ( 15 Ata 2025 09:35 )   PT: 11.2 sec;   INR: 0.97 ratio         PTT - ( 15 Ata 2025 09:35 )  PTT:27.0 sec  Urinalysis Basic - ( 16 Jun 2025 03:14 )    Color: x / Appearance: x / SG: x / pH: x  Gluc: 291 mg/dL / Ketone: x  / Bili: x / Urobili: x   Blood: x / Protein: x / Nitrite: x   Leuk Esterase: x / RBC: x / WBC x   Sq Epi: x / Non Sq Epi: x / Bacteria: x      CAPILLARY BLOOD GLUCOSE      POCT Blood Glucose.: 271 mg/dL (16 Jun 2025 08:12)  POCT Blood Glucose.: 226 mg/dL (15 Ata 2025 22:25)  POCT Blood Glucose.: 208 mg/dL (15 Ata 2025 20:03)  POCT Blood Glucose.: 167 mg/dL (15 Ata 2025 18:58)  POCT Blood Glucose.: 216 mg/dL (15 Ata 2025 17:19)  POCT Blood Glucose.: 300 mg/dL (15 Ata 2025 16:23)  POCT Blood Glucose.: 343 mg/dL (15 Ata 2025 15:25)  POCT Blood Glucose.: 364 mg/dL (15 Ata 2025 14:15)  POCT Blood Glucose.: 436 mg/dL (15 Ata 2025 13:15)  POCT Blood Glucose.: 409 mg/dL (15 Ata 2025 12:16)  POCT Blood Glucose.: 470 mg/dL (15 Ata 2025 12:16)  POCT Blood Glucose.: 502 mg/dL (15 Ata 2025 11:06)        Urinalysis Basic - ( 16 Jun 2025 03:14 )    Color: x / Appearance: x / SG: x / pH: x  Gluc: 291 mg/dL / Ketone: x  / Bili: x / Urobili: x   Blood: x / Protein: x / Nitrite: x   Leuk Esterase: x / RBC: x / WBC x   Sq Epi: x / Non Sq Epi: x / Bacteria: x        Urinalysis with Rflx Culture (collected 06-15-25 @ 10:35)      RADIOLOGY & ADDITIONAL TESTS:    Imaging Personally Reviewed:  [x ] YES  [ ] NO    44 year old M with PMHx HTN, gastroparesis, marijuana use, DM2 presented to ED with abdominal pain, nausea, vomiting admitted to ICU with DKA 2/2 to insulin noncompliance.     To follow up:  - DKA 2/2 non compliance requiring insulin drip. Now transitioned to basal bolus. cont to titrate insulin to FS  - RYANNE 2/2 dehydration. cont to encourage PO free water intake and IVF  - wbc ct likely reactive to DKA. afebrile and no infectious sx. cont to monitor off abx    d/w dr dawn and MICU DOWN GRADE NOTE  Accepting MD: dr bradshaw     Patient is a 44y old  Male who presents with a chief complaint of DKA (15 Ata 2025 13:43)    HPI:  44 year old M with PMHx HTN, gastroparesis, marijuana use, DM2 presented to ED with abdominal pain, nausea, vomiting. Patient has not taken insulin in the past 2 days due to not feeling well. In ED found to have , pH 7.26, AG 18, HCO3 15 Lactate 3.3 and RYANNE. In ED patient given 3L IVF and 10u IVP insulin. CTAP: Distended bladder. Mildly enlarged prostate.Repeat labwork without large improvement. Accepted to ICU for further management of DKA.     INTERVAL HPI/OVERNIGHT EVENTS: pt's mild dka improved and transitioned to basal bolus therapy now tolerating diet. Pt stable for downgrade to medicine.           MEDICATIONS:  chlorhexidine 2% Cloths 1 Application(s) Topical <User Schedule>  enoxaparin Injectable 40 milliGRAM(s) SubCutaneous every 24 hours  insulin glargine Injectable (LANTUS) 35 Unit(s) SubCutaneous at bedtime  insulin lispro (ADMELOG) corrective regimen sliding scale   SubCutaneous three times a day before meals  insulin lispro Injectable (ADMELOG) 7 Unit(s) SubCutaneous two times a day before meals      T(C): 36.4 (06-16-25 @ 07:27), Max: 36.9 (06-15-25 @ 17:01)  HR: 85 (06-16-25 @ 07:00) (76 - 103)  BP: 152/103 (06-16-25 @ 07:00) (98/65 - 152/103)  RR: 16 (06-16-25 @ 07:00) (12 - 18)  SpO2: 100% (06-16-25 @ 07:00) (99% - 100%)  Wt(kg): --Vital Signs Last 24 Hrs  T(C): 36.4 (16 Jun 2025 07:27), Max: 36.9 (15 Ata 2025 17:01)  T(F): 97.6 (16 Jun 2025 07:27), Max: 98.5 (15 Ata 2025 17:01)  HR: 85 (16 Jun 2025 07:00) (76 - 103)  BP: 152/103 (16 Jun 2025 07:00) (98/65 - 152/103)  BP(mean): 117 (16 Jun 2025 07:00) (75 - 118)  RR: 16 (16 Jun 2025 07:00) (12 - 18)  SpO2: 100% (16 Jun 2025 07:00) (99% - 100%)    Parameters below as of 15 Ata 2025 19:30  Patient On (Oxygen Delivery Method): room air          Consultant(s) Notes Reviewed:  [x ] YES  [ ] NO  Care Discussed with Consultants/Other Providers [ x] YES  [ ] NO    LABS:                        12.8   12.58 )-----------( 311      ( 16 Jun 2025 03:14 )             39.4     06-16    131[L]  |  103  |  44[H]  ----------------------------<  291[H]  5.1   |  19[L]  |  1.61[H]    Ca    8.4[L]      16 Jun 2025 03:14  Phos  2.8     06-16  Mg     2.7     06-16    TPro  6.4  /  Alb  2.9[L]  /  TBili  0.9  /  DBili  x   /  AST  14[L]  /  ALT  15  /  AlkPhos  60  06-16    PT/INR - ( 15 Ata 2025 09:35 )   PT: 11.2 sec;   INR: 0.97 ratio         PTT - ( 15 Ata 2025 09:35 )  PTT:27.0 sec  Urinalysis Basic - ( 16 Jun 2025 03:14 )    Color: x / Appearance: x / SG: x / pH: x  Gluc: 291 mg/dL / Ketone: x  / Bili: x / Urobili: x   Blood: x / Protein: x / Nitrite: x   Leuk Esterase: x / RBC: x / WBC x   Sq Epi: x / Non Sq Epi: x / Bacteria: x      CAPILLARY BLOOD GLUCOSE      POCT Blood Glucose.: 271 mg/dL (16 Jun 2025 08:12)  POCT Blood Glucose.: 226 mg/dL (15 Ata 2025 22:25)  POCT Blood Glucose.: 208 mg/dL (15 Ata 2025 20:03)  POCT Blood Glucose.: 167 mg/dL (15 Ata 2025 18:58)  POCT Blood Glucose.: 216 mg/dL (15 Ata 2025 17:19)  POCT Blood Glucose.: 300 mg/dL (15 Ata 2025 16:23)  POCT Blood Glucose.: 343 mg/dL (15 Ata 2025 15:25)  POCT Blood Glucose.: 364 mg/dL (15 Ata 2025 14:15)  POCT Blood Glucose.: 436 mg/dL (15 Ata 2025 13:15)  POCT Blood Glucose.: 409 mg/dL (15 Ata 2025 12:16)  POCT Blood Glucose.: 470 mg/dL (15 Ata 2025 12:16)  POCT Blood Glucose.: 502 mg/dL (15 Ata 2025 11:06)        Urinalysis Basic - ( 16 Jun 2025 03:14 )    Color: x / Appearance: x / SG: x / pH: x  Gluc: 291 mg/dL / Ketone: x  / Bili: x / Urobili: x   Blood: x / Protein: x / Nitrite: x   Leuk Esterase: x / RBC: x / WBC x   Sq Epi: x / Non Sq Epi: x / Bacteria: x        Urinalysis with Rflx Culture (collected 06-15-25 @ 10:35)      RADIOLOGY & ADDITIONAL TESTS:    Imaging Personally Reviewed:  [x ] YES  [ ] NO    44 year old M with PMHx HTN, gastroparesis, marijuana use, DM2 presented to ED with abdominal pain, nausea, vomiting admitted to ICU with DKA 2/2 to insulin noncompliance.     To follow up:  - DKA 2/2 non compliance requiring insulin drip. Now transitioned to basal bolus. cont to titrate insulin to FS  - RYANNE 2/2 dehydration. cont to encourage PO free water intake and IVF  - wbc ct likely reactive to DKA. afebrile and no infectious sx. cont to monitor off abx    d/w dr dawn and dr isaac

## 2025-06-16 NOTE — PROGRESS NOTE ADULT - SUBJECTIVE AND OBJECTIVE BOX
HPI:  Pt is a 43 yo BM with h/o HTN, DM complicated by gastroparesis presented to 2 to abdominal pain and N/V. Pt did not take Insulin 2 days PTA due to not feeling well. In the ER pt found to be in DKA. ICU dx: 1) DKA 2) RYANNE      ## Labs:  CBC:                        12.8   12.58 )-----------( 311      ( 2025 03:14 )             39.4     Chem:      131[L]  |  103  |  44[H]  ----------------------------<  291[H]  5.1   |  19[L]  |  1.61[H]    Ca    8.4[L]      2025 03:14  Phos  2.8       Mg     2.7         TPro  6.4  /  Alb  2.9[L]  /  TBili  0.9  /  DBili  x   /  AST  14[L]  /  ALT  15  /  AlkPhos  60      Coags:  PT/INR - ( 15 Ata 2025 09:35 )   PT: 11.2 sec;   INR: 0.97 ratio         PTT - ( 15 Ata 2025 09:35 )  PTT:27.0 sec        ## Imaging:    ## Medications:        insulin glargine Injectable (LANTUS) 35 Unit(s) SubCutaneous at bedtime  insulin lispro (ADMELOG) corrective regimen sliding scale   SubCutaneous three times a day before meals  insulin lispro Injectable (ADMELOG) 7 Unit(s) SubCutaneous three times a day before meals    enoxaparin Injectable 40 milliGRAM(s) SubCutaneous every 24 hours          ## Vitals:  T(C): 36.6 (25 @ 11:21), Max: 36.9 (06-15-25 @ 17:01)  HR: 93 (25 @ 12:00) (76 - 103)  BP: 146/99 (25 @ 12:00) (98/65 - 152/103)  BP(mean): 112 (25 @ 12:00) (75 - 118)  RR: 13 (25 @ 12:00) (12 - 20)  SpO2: 100% (25 @ 12:00) (99% - 100%)  Wt(kg): --  Vent:   AB-15 @ 07:01  -   @ 07:00  --------------------------------------------------------  IN: 1020 mL / OUT: 1270 mL / NET: -250 mL     @ 07:01  -   @ 12:41  --------------------------------------------------------  IN: 1650 mL / OUT: 400 mL / NET: 1250 mL          ## P/E:  Gen: lying comfortably in bed in no apparent distress  Lungs: CTA  Heart: RRR  Abd: Soft/+BS  Ext: No edema  Neuro: AAo x3    CENTRAL LINE: [ ] YES [ ] NO  LOCATION:   DATE INSERTED:  REMOVE: [ ] YES [ ] NO      GARCIA: [ ] YES [ ] NO    DATE INSERTED:  REMOVE:  [ ] YES [ ] NO      A-LINE:  [ ] YES [ ] NO  LOCATION:   DATE INSERTED:  REMOVE:  [ ] YES [ ] NO  EXPLAIN:    CODE STATUS: [ ] full code  [ ] DNR  [ ] [facility]NI  [ ] [facility]  Goals of care discussion: [ ] yes

## 2025-06-17 LAB
ALBUMIN SERPL ELPH-MCNC: 2.9 G/DL — LOW (ref 3.3–5)
ALP SERPL-CCNC: 53 U/L — SIGNIFICANT CHANGE UP (ref 40–120)
ALT FLD-CCNC: 19 U/L — SIGNIFICANT CHANGE UP (ref 12–78)
ANION GAP SERPL CALC-SCNC: 7 MMOL/L — SIGNIFICANT CHANGE UP (ref 5–17)
AST SERPL-CCNC: 12 U/L — LOW (ref 15–37)
BASOPHILS # BLD AUTO: 0.02 K/UL — SIGNIFICANT CHANGE UP (ref 0–0.2)
BASOPHILS NFR BLD AUTO: 0.2 % — SIGNIFICANT CHANGE UP (ref 0–2)
BILIRUB SERPL-MCNC: 0.7 MG/DL — SIGNIFICANT CHANGE UP (ref 0.2–1.2)
BUN SERPL-MCNC: 30 MG/DL — HIGH (ref 7–23)
CALCIUM SERPL-MCNC: 8.6 MG/DL — SIGNIFICANT CHANGE UP (ref 8.5–10.1)
CHLORIDE SERPL-SCNC: 103 MMOL/L — SIGNIFICANT CHANGE UP (ref 96–108)
CO2 SERPL-SCNC: 22 MMOL/L — SIGNIFICANT CHANGE UP (ref 22–31)
CREAT SERPL-MCNC: 1.25 MG/DL — SIGNIFICANT CHANGE UP (ref 0.5–1.3)
EGFR: 74 ML/MIN/1.73M2 — SIGNIFICANT CHANGE UP
EGFR: 74 ML/MIN/1.73M2 — SIGNIFICANT CHANGE UP
EOSINOPHIL # BLD AUTO: 0.05 K/UL — SIGNIFICANT CHANGE UP (ref 0–0.5)
EOSINOPHIL NFR BLD AUTO: 0.6 % — SIGNIFICANT CHANGE UP (ref 0–6)
GLUCOSE BLDC GLUCOMTR-MCNC: 196 MG/DL — HIGH (ref 70–99)
GLUCOSE BLDC GLUCOMTR-MCNC: 249 MG/DL — HIGH (ref 70–99)
GLUCOSE BLDC GLUCOMTR-MCNC: 254 MG/DL — HIGH (ref 70–99)
GLUCOSE BLDC GLUCOMTR-MCNC: 256 MG/DL — HIGH (ref 70–99)
GLUCOSE BLDC GLUCOMTR-MCNC: 256 MG/DL — HIGH (ref 70–99)
GLUCOSE SERPL-MCNC: 269 MG/DL — HIGH (ref 70–99)
HCT VFR BLD CALC: 37.9 % — LOW (ref 39–50)
HGB BLD-MCNC: 12.3 G/DL — LOW (ref 13–17)
IMM GRANULOCYTES NFR BLD AUTO: 0.7 % — SIGNIFICANT CHANGE UP (ref 0–0.9)
LYMPHOCYTES # BLD AUTO: 3.45 K/UL — HIGH (ref 1–3.3)
LYMPHOCYTES # BLD AUTO: 42.7 % — SIGNIFICANT CHANGE UP (ref 13–44)
MAGNESIUM SERPL-MCNC: 2.5 MG/DL — SIGNIFICANT CHANGE UP (ref 1.6–2.6)
MCHC RBC-ENTMCNC: 25.9 PG — LOW (ref 27–34)
MCHC RBC-ENTMCNC: 32.5 G/DL — SIGNIFICANT CHANGE UP (ref 32–36)
MCV RBC AUTO: 80 FL — SIGNIFICANT CHANGE UP (ref 80–100)
MONOCYTES # BLD AUTO: 0.77 K/UL — SIGNIFICANT CHANGE UP (ref 0–0.9)
MONOCYTES NFR BLD AUTO: 9.5 % — SIGNIFICANT CHANGE UP (ref 2–14)
NEUTROPHILS # BLD AUTO: 3.73 K/UL — SIGNIFICANT CHANGE UP (ref 1.8–7.4)
NEUTROPHILS NFR BLD AUTO: 46.3 % — SIGNIFICANT CHANGE UP (ref 43–77)
NRBC BLD AUTO-RTO: 0 /100 WBCS — SIGNIFICANT CHANGE UP (ref 0–0)
PHOSPHATE SERPL-MCNC: 2.3 MG/DL — LOW (ref 2.5–4.5)
PLATELET # BLD AUTO: 284 K/UL — SIGNIFICANT CHANGE UP (ref 150–400)
POTASSIUM SERPL-MCNC: 4 MMOL/L — SIGNIFICANT CHANGE UP (ref 3.5–5.3)
POTASSIUM SERPL-SCNC: 4 MMOL/L — SIGNIFICANT CHANGE UP (ref 3.5–5.3)
PROT SERPL-MCNC: 6.1 GM/DL — SIGNIFICANT CHANGE UP (ref 6–8.3)
RBC # BLD: 4.74 M/UL — SIGNIFICANT CHANGE UP (ref 4.2–5.8)
RBC # FLD: 13.2 % — SIGNIFICANT CHANGE UP (ref 10.3–14.5)
SODIUM SERPL-SCNC: 132 MMOL/L — LOW (ref 135–145)
WBC # BLD: 8.08 K/UL — SIGNIFICANT CHANGE UP (ref 3.8–10.5)
WBC # FLD AUTO: 8.08 K/UL — SIGNIFICANT CHANGE UP (ref 3.8–10.5)

## 2025-06-17 PROCEDURE — 99232 SBSQ HOSP IP/OBS MODERATE 35: CPT

## 2025-06-17 RX ORDER — MAGNESIUM, ALUMINUM HYDROXIDE 200-200 MG
30 TABLET,CHEWABLE ORAL EVERY 6 HOURS
Refills: 0 | Status: DISCONTINUED | OUTPATIENT
Start: 2025-06-17 | End: 2025-06-18

## 2025-06-17 RX ORDER — SOD PHOS DI, MONO/K PHOS MONO 250 MG
1 TABLET ORAL
Refills: 0 | Status: COMPLETED | OUTPATIENT
Start: 2025-06-17 | End: 2025-06-17

## 2025-06-17 RX ORDER — INSULIN GLARGINE-YFGN 100 [IU]/ML
38 INJECTION, SOLUTION SUBCUTANEOUS AT BEDTIME
Refills: 0 | Status: DISCONTINUED | OUTPATIENT
Start: 2025-06-17 | End: 2025-06-18

## 2025-06-17 RX ORDER — MAGNESIUM, ALUMINUM HYDROXIDE 200-200 MG
30 TABLET,CHEWABLE ORAL ONCE
Refills: 0 | Status: COMPLETED | OUTPATIENT
Start: 2025-06-17 | End: 2025-06-17

## 2025-06-17 RX ORDER — INSULIN LISPRO 100 U/ML
10 INJECTION, SOLUTION INTRAVENOUS; SUBCUTANEOUS
Refills: 0 | Status: DISCONTINUED | OUTPATIENT
Start: 2025-06-17 | End: 2025-06-18

## 2025-06-17 RX ORDER — ONDANSETRON HCL/PF 4 MG/2 ML
4 VIAL (ML) INJECTION ONCE
Refills: 0 | Status: COMPLETED | OUTPATIENT
Start: 2025-06-17 | End: 2025-06-17

## 2025-06-17 RX ADMIN — INSULIN GLARGINE-YFGN 38 UNIT(S): 100 INJECTION, SOLUTION SUBCUTANEOUS at 21:47

## 2025-06-17 RX ADMIN — INSULIN LISPRO 3: 100 INJECTION, SOLUTION INTRAVENOUS; SUBCUTANEOUS at 08:48

## 2025-06-17 RX ADMIN — Medication 30 MILLILITER(S): at 23:13

## 2025-06-17 RX ADMIN — INSULIN LISPRO 10 UNIT(S): 100 INJECTION, SOLUTION INTRAVENOUS; SUBCUTANEOUS at 17:12

## 2025-06-17 RX ADMIN — ENOXAPARIN SODIUM 40 MILLIGRAM(S): 100 INJECTION SUBCUTANEOUS at 05:24

## 2025-06-17 RX ADMIN — Medication 1 PACKET(S): at 10:00

## 2025-06-17 RX ADMIN — Medication 30 MILLILITER(S): at 03:02

## 2025-06-17 RX ADMIN — INSULIN LISPRO 2: 100 INJECTION, SOLUTION INTRAVENOUS; SUBCUTANEOUS at 17:12

## 2025-06-17 RX ADMIN — SODIUM CHLORIDE 75 MILLILITER(S): 9 INJECTION, SOLUTION INTRAVENOUS at 05:26

## 2025-06-17 RX ADMIN — Medication 1 PACKET(S): at 17:13

## 2025-06-17 RX ADMIN — Medication 4 MILLIGRAM(S): at 08:49

## 2025-06-17 RX ADMIN — INSULIN LISPRO 3: 100 INJECTION, SOLUTION INTRAVENOUS; SUBCUTANEOUS at 12:05

## 2025-06-17 RX ADMIN — INSULIN LISPRO 7 UNIT(S): 100 INJECTION, SOLUTION INTRAVENOUS; SUBCUTANEOUS at 08:49

## 2025-06-17 RX ADMIN — Medication 1 APPLICATION(S): at 05:25

## 2025-06-17 NOTE — PROGRESS NOTE ADULT - SUBJECTIVE AND OBJECTIVE BOX
Patient is a 44y old  Male who presents with a chief complaint of DKA (16 Jun 2025 12:40)      INTERVAL HPI/OVERNIGHT EVENTS:  Patient seen and examined at bedside. Feeling better. No new complaints. Denies SOB, palpitations, vomiting, nausea, Chest pain, cough,    MEDICATIONS  (STANDING):  chlorhexidine 2% Cloths 1 Application(s) Topical <User Schedule>  enoxaparin Injectable 40 milliGRAM(s) SubCutaneous every 24 hours  insulin glargine Injectable (LANTUS) 38 Unit(s) SubCutaneous at bedtime  insulin lispro (ADMELOG) corrective regimen sliding scale   SubCutaneous three times a day before meals  insulin lispro Injectable (ADMELOG) 10 Unit(s) SubCutaneous three times a day before meals  lactated ringers. 1000 milliLiter(s) (75 mL/Hr) IV Continuous <Continuous>  pantoprazole    Tablet 40 milliGRAM(s) Oral before breakfast    MEDICATIONS  (PRN):      Allergies    No Known Allergies    Intolerances        REVIEW OF SYSTEMS:  As above    Vital Signs Last 24 Hrs  T(C): 36.8 (17 Jun 2025 17:25), Max: 37.4 (16 Jun 2025 23:48)  T(F): 98.2 (17 Jun 2025 17:25), Max: 99.3 (16 Jun 2025 23:48)  HR: 91 (17 Jun 2025 17:25) (80 - 93)  BP: 114/82 (17 Jun 2025 17:25) (106/71 - 143/88)  BP(mean): --  RR: 19 (17 Jun 2025 17:25) (18 - 19)  SpO2: 96% (17 Jun 2025 17:25) (96% - 100%)    Parameters below as of 17 Jun 2025 11:16  Patient On (Oxygen Delivery Method): room air        PHYSICAL EXAM:  Gen: lying comfortably in bed in no apparent distress  Lungs: CTA  Heart: RRR  Abd: Soft/+BS  Ext: No edema  Neuro: AAo x3    LABS:                        12.3   8.08  )-----------( 284      ( 17 Jun 2025 06:25 )             37.9     17 Jun 2025 06:25    132    |  103    |  30     ----------------------------<  269    4.0     |  22     |  1.25     Ca    8.6        17 Jun 2025 06:25  Phos  2.3       17 Jun 2025 06:25  Mg     2.5       17 Jun 2025 06:25    TPro  6.1    /  Alb  2.9    /  TBili  0.7    /  DBili  x      /  AST  12     /  ALT  19     /  AlkPhos  53     17 Jun 2025 06:25      Urinalysis Basic - ( 17 Jun 2025 06:25 )    Color: x / Appearance: x / SG: x / pH: x  Gluc: 269 mg/dL / Ketone: x  / Bili: x / Urobili: x   Blood: x / Protein: x / Nitrite: x   Leuk Esterase: x / RBC: x / WBC x   Sq Epi: x / Non Sq Epi: x / Bacteria: x      CAPILLARY BLOOD GLUCOSE      POCT Blood Glucose.: 249 mg/dL (17 Jun 2025 16:36)  POCT Blood Glucose.: 256 mg/dL (17 Jun 2025 15:46)  POCT Blood Glucose.: 254 mg/dL (17 Jun 2025 11:34)  POCT Blood Glucose.: 256 mg/dL (17 Jun 2025 08:05)  POCT Blood Glucose.: 368 mg/dL (16 Jun 2025 21:17)

## 2025-06-17 NOTE — PHARMACOTHERAPY INTERVENTION NOTE - COMMENTS
Patient sugars have been over 250mg/dL. Spoke to NP about adjusting patients insulin regimen. Recommended increasing patients pre-meal Admelog to 10units TID before meals and increasing Lantus to 38 units @HS. NP was in agreement with the recommendations.   
Spoke to patient Power Traylor on 06/17/25. Provided patient diabetes education and counseling. Went over patients medications and re-enforced medication adherence with patient. Explained to patient signs and symptoms of hypoglycema/hyperglycemia. Also counseled patient to check their sugars more regularly to help control their diabetes. Encouraged patient to make diet changes and participate in daily exercise. Also spoke to patient about scheduling routine foot and eye exams outpatient. Patient understood the counseling and had no further questions.

## 2025-06-17 NOTE — PROGRESS NOTE ADULT - ASSESSMENT
44 year old M with PMHx HTN, gastroparesis, marijuana use, DM2 presented to ED with abdominal pain, nausea, vomiting admitted to ICU with DKA 2/2 to insulin noncompliance. Now downgraded to floors.    DKA:  Anion normal  S/p insulin drip  Lantus 38units  Lispro 10 tid ac  On consistent carb diet  Monitor fingersticks    Gastroparesis/GERD  On protonix     RYANNE:  Improving  2/2 vomiting/dehydration  monitor BMP  Avoid nephrotoxic agents    DVT: lovenox dvt prophylaxis

## 2025-06-18 ENCOUNTER — TRANSCRIPTION ENCOUNTER (OUTPATIENT)
Age: 45
End: 2025-06-18

## 2025-06-18 VITALS
RESPIRATION RATE: 18 BRPM | HEART RATE: 99 BPM | TEMPERATURE: 98 F | DIASTOLIC BLOOD PRESSURE: 85 MMHG | OXYGEN SATURATION: 100 % | SYSTOLIC BLOOD PRESSURE: 116 MMHG

## 2025-06-18 LAB
ANION GAP SERPL CALC-SCNC: 6 MMOL/L — SIGNIFICANT CHANGE UP (ref 5–17)
BUN SERPL-MCNC: 24 MG/DL — HIGH (ref 7–23)
CALCIUM SERPL-MCNC: 8.7 MG/DL — SIGNIFICANT CHANGE UP (ref 8.5–10.1)
CHLORIDE SERPL-SCNC: 101 MMOL/L — SIGNIFICANT CHANGE UP (ref 96–108)
CO2 SERPL-SCNC: 26 MMOL/L — SIGNIFICANT CHANGE UP (ref 22–31)
CREAT SERPL-MCNC: 1.28 MG/DL — SIGNIFICANT CHANGE UP (ref 0.5–1.3)
EGFR: 72 ML/MIN/1.73M2 — SIGNIFICANT CHANGE UP
EGFR: 72 ML/MIN/1.73M2 — SIGNIFICANT CHANGE UP
GLUCOSE BLDC GLUCOMTR-MCNC: 216 MG/DL — HIGH (ref 70–99)
GLUCOSE BLDC GLUCOMTR-MCNC: 271 MG/DL — HIGH (ref 70–99)
GLUCOSE SERPL-MCNC: 236 MG/DL — HIGH (ref 70–99)
HCT VFR BLD CALC: 40.1 % — SIGNIFICANT CHANGE UP (ref 39–50)
HGB BLD-MCNC: 12.7 G/DL — LOW (ref 13–17)
MAGNESIUM SERPL-MCNC: 2.3 MG/DL — SIGNIFICANT CHANGE UP (ref 1.6–2.6)
MCHC RBC-ENTMCNC: 25.9 PG — LOW (ref 27–34)
MCHC RBC-ENTMCNC: 31.7 G/DL — LOW (ref 32–36)
MCV RBC AUTO: 81.8 FL — SIGNIFICANT CHANGE UP (ref 80–100)
NRBC BLD AUTO-RTO: 0 /100 WBCS — SIGNIFICANT CHANGE UP (ref 0–0)
PLATELET # BLD AUTO: 281 K/UL — SIGNIFICANT CHANGE UP (ref 150–400)
POTASSIUM SERPL-MCNC: 3.9 MMOL/L — SIGNIFICANT CHANGE UP (ref 3.5–5.3)
POTASSIUM SERPL-SCNC: 3.9 MMOL/L — SIGNIFICANT CHANGE UP (ref 3.5–5.3)
RBC # BLD: 4.9 M/UL — SIGNIFICANT CHANGE UP (ref 4.2–5.8)
RBC # FLD: 13.5 % — SIGNIFICANT CHANGE UP (ref 10.3–14.5)
SODIUM SERPL-SCNC: 133 MMOL/L — LOW (ref 135–145)
WBC # BLD: 8.32 K/UL — SIGNIFICANT CHANGE UP (ref 3.8–10.5)
WBC # FLD AUTO: 8.32 K/UL — SIGNIFICANT CHANGE UP (ref 3.8–10.5)

## 2025-06-18 PROCEDURE — 99239 HOSP IP/OBS DSCHRG MGMT >30: CPT

## 2025-06-18 RX ORDER — INSULIN GLARGINE-YFGN 100 [IU]/ML
38 INJECTION, SOLUTION SUBCUTANEOUS
Qty: 0 | Refills: 0
Start: 2025-06-18

## 2025-06-18 RX ORDER — INSULIN LISPRO 100 U/ML
10 INJECTION, SOLUTION INTRAVENOUS; SUBCUTANEOUS
Qty: 0 | Refills: 0 | DISCHARGE
Start: 2025-06-18

## 2025-06-18 RX ADMIN — INSULIN LISPRO 3: 100 INJECTION, SOLUTION INTRAVENOUS; SUBCUTANEOUS at 08:38

## 2025-06-18 RX ADMIN — ENOXAPARIN SODIUM 40 MILLIGRAM(S): 100 INJECTION SUBCUTANEOUS at 05:43

## 2025-06-18 RX ADMIN — INSULIN LISPRO 10 UNIT(S): 100 INJECTION, SOLUTION INTRAVENOUS; SUBCUTANEOUS at 12:02

## 2025-06-18 RX ADMIN — Medication 1 APPLICATION(S): at 06:02

## 2025-06-18 RX ADMIN — INSULIN LISPRO 10 UNIT(S): 100 INJECTION, SOLUTION INTRAVENOUS; SUBCUTANEOUS at 08:39

## 2025-06-18 RX ADMIN — Medication 40 MILLIGRAM(S): at 06:03

## 2025-06-18 RX ADMIN — INSULIN LISPRO 2: 100 INJECTION, SOLUTION INTRAVENOUS; SUBCUTANEOUS at 12:02

## 2025-06-18 NOTE — DISCHARGE NOTE PROVIDER - NSDCCPCAREPLAN_GEN_ALL_CORE_FT
PRINCIPAL DISCHARGE DIAGNOSIS  Diagnosis: DKA (diabetic ketoacidosis)  Assessment and Plan of Treatment: You came to the hospital because you had DKA.   We started Lantus 38units and Lispro 10 tid ac  On consistent carb diet. We Monitored fingersticks.   May continue on 38 units long acting and short acting 7 units at home.  Please follow up with your doctor outpatient.         SECONDARY DISCHARGE DIAGNOSES  Diagnosis: Dehydration  Assessment and Plan of Treatment: We gave you IV hydration and you felt better    Diagnosis: RYANNE (acute kidney injury)  Assessment and Plan of Treatment: also found to have RYANNE due to dehydration now improved    Diagnosis: GERD (gastroesophageal reflux disease)  Assessment and Plan of Treatment: For Gastroparesis/GERD, continue on protonix     PRINCIPAL DISCHARGE DIAGNOSIS  Diagnosis: DKA (diabetic ketoacidosis)  Assessment and Plan of Treatment: You came to the hospital because you had DKA.   We started Lantus 38units and Lispro 10 tid ac  On consistent carb diet. We Monitored fingersticks.   May continue on 38 units long acting and short acting 10 units at home.  Please follow up with your doctor outpatient.         SECONDARY DISCHARGE DIAGNOSES  Diagnosis: Dehydration  Assessment and Plan of Treatment: We gave you IV hydration and you felt better    Diagnosis: RYANNE (acute kidney injury)  Assessment and Plan of Treatment: also found to have RYANNE due to dehydration now improved    Diagnosis: GERD (gastroesophageal reflux disease)  Assessment and Plan of Treatment: For Gastroparesis/GERD, continue on protonix

## 2025-06-18 NOTE — DISCHARGE NOTE PROVIDER - PROVIDER TOKENS
FREE:[LAST:[MD in [place] f/u],PHONE:[(   )    -],FAX:[(   )    -]] FREE:[LAST:[MD in [place] f/u],PHONE:[(   )    -],FAX:[(   )    -]],PROVIDER:[TOKEN:[[id number]],FOLLOWUP:[1-3 days]],PROVIDER:[TOKEN:[[id number]],FOLLOWUP:[2 weeks]]

## 2025-06-18 NOTE — DISCHARGE NOTE PROVIDER - CARE PROVIDERS DIRECT ADDRESSES
,DirectAddress_Unknown ,DirectAddress_Unknown,[email or web address]

## 2025-06-18 NOTE — DISCHARGE NOTE NURSING/CASE MANAGEMENT/SOCIAL WORK - FINANCIAL ASSISTANCE
[facility] provides services at a reduced cost to those who are determined to be eligible through [facility]’s financial assistance program. Information regarding [facility]’s financial assistance program can be found by going to [email or web address] or by calling [phone].

## 2025-06-18 NOTE — DISCHARGE NOTE PROVIDER - HOSPITAL COURSE
HPI:  44 year old M with PMHx HTN, gastroparesis, marijuana use, DM2 presented to ED with abdominal pain, nausea, vomiting. Patient has not taken insulin in the past 2 days due to not feeling well. In ED found to have , pH 7.26, AG 18, HCO3 15 Lactate 3.3 and RYANNE. In ED patient given 3L IVF and 10u IVP insulin. CTAP: Distended bladder. Mildly enlarged prostate.Repeat labwork without large improvement. Accepted to ICU for further management of DKA.          (15 Ata 2025 13:43)      ---  HOSPITAL COURSE: Patient admitted to medicine floor for management of DKA.  44 year old M with PMHx HTN, gastroparesis, marijuana use, DM2 presented to ED with abdominal pain, nausea, vomiting admitted to ICU with DKA 2/2 to insulin noncompliance from vomiting infectious etiology ruled out. Now downgraded to floors. His Anion gap is normal at this time S/p insulin drip. We started Lantus 38units and Lispro 10 tid ac  On consistent carb diet. We Monitored fingersticks. May continue on 38 units long acting and short acting 7 units at home. For his Gastroparesis/GERD, we continued on protonix.   He was also found to have RYANNE due to dehydration now improved.We avoided nephrotoxic agents.  Pt seen and examined on day of discharge. Patient is medically optimized for discharge to home with close outpatient followup.    PHYSICAL EXAM ON DAY OF DISCHARGE:  Gen: lying comfortably in bed in no apparent distress  Lungs: CTA  Heart: RRR  Abd: Soft/+BS  Ext: No edema  Neuro: AAo x3        ---  CONSULTANTS:   ICU    ---  TIME SPENT:  I, the attending physician, was physically present for the key portions of the evaluation and management (E/M) service provided. The total amount of time spent reviewing the hospital notes, laboratory values, imaging findings, assessing/counseling the patient, discussing with consultant physicians, social work, nursing staff was -40- minutes     HPI:  44 year old M with PMHx HTN, gastroparesis, marijuana use, DM2 presented to ED with abdominal pain, nausea, vomiting. Patient has not taken insulin in the past 2 days due to not feeling well. In ED found to have , pH 7.26, AG 18, HCO3 15 Lactate 3.3 and RYANNE. In ED patient given 3L IVF and 10u IVP insulin. CTAP: Distended bladder. Mildly enlarged prostate.Repeat labwork without large improvement. Accepted to ICU for further management of DKA.          (15 Ata 2025 13:43)      ---  HOSPITAL COURSE: Patient admitted to medicine floor for management of DKA.  44 year old M with PMHx HTN, gastroparesis, marijuana use, DM2 presented to ED with abdominal pain, nausea, vomiting admitted to ICU with DKA 2/2 to insulin noncompliance from vomiting infectious etiology ruled out. Now downgraded to floors. His Anion gap is normal at this time S/p insulin drip. We started Lantus 38units and Lispro 10 tid ac  On consistent carb diet. We Monitored fingersticks. May continue on 38 units long acting and short acting 10 units at home. For his Gastroparesis/GERD, we continued on protonix.   He was also found to have RYANNE due to dehydration now improved.We avoided nephrotoxic agents. Patient reports he urgently would like to go home and will see endocrinologist outpatient.  Pt seen and examined on day of discharge. Patient is medically optimized for discharge to home with close outpatient followup.    PHYSICAL EXAM ON DAY OF DISCHARGE:  Gen: lying comfortably in bed in no apparent distress  Lungs: CTA  Heart: RRR  Abd: Soft/+BS  Ext: No edema  Neuro: AAo x3        ---  CONSULTANTS:   ICU    ---  TIME SPENT:  I, the attending physician, was physically present for the key portions of the evaluation and management (E/M) service provided. The total amount of time spent reviewing the hospital notes, laboratory values, imaging findings, assessing/counseling the patient, discussing with consultant physicians, social work, nursing staff was -40- minutes

## 2025-06-18 NOTE — DISCHARGE NOTE PROVIDER - CARE PROVIDER_API CALL
MD in [place] f/u,   Phone: (   )    -  Fax: (   )    -  Follow Up Time:    MD in [place] f/u,   Phone: (   )    -  Fax: (   )    -  Follow Up Time:     Waldemar Chávez  Endocrinology Diabetes and Metabolism  [address], [address]  [place] [address]  Phone: [phone]  Fax: [phone]  Follow Up Time: 1-3 days    Conor Toth  Gastroenterology  [address], [address]  [place] [address]  Phone: [phone]  Fax: [phone]  Follow Up Time: 2 weeks

## 2025-06-18 NOTE — DISCHARGE NOTE PROVIDER - REASON FOR ADMISSION
[FreeTextEntry1] : We discussed the value of antibiotics in a clinical situation such as this.  The diagnosis of sinusitis cannot be ruled out but on the other hand is not a certainty.  We discussed the risks of antibiotics.  In the end I did prescribe antibiotics to this patient.
DKA

## 2025-06-18 NOTE — DISCHARGE NOTE PROVIDER - NSDCMRMEDTOKEN_GEN_ALL_CORE_FT
insulin lispro 100 units/mL injectable solution: 7 unit(s) injectable 3 times a day With meal please check glucose prior to administration of insulin  pantoprazole 40 mg oral delayed release tablet: 1 tab(s) orally once a day (before a meal)

## 2025-06-18 NOTE — DISCHARGE NOTE NURSING/CASE MANAGEMENT/SOCIAL WORK - PATIENT PORTAL LINK FT
You can access the FollowMyHealth Patient Portal offered by [facility] by registering at the following website: [email or web address]. By joining [employer]’s FollowMyHealth portal, you will also be able to view your health information using other applications (apps) compatible with our system.

## 2025-06-20 LAB
CULTURE RESULTS: SIGNIFICANT CHANGE UP
CULTURE RESULTS: SIGNIFICANT CHANGE UP
SPECIMEN SOURCE: SIGNIFICANT CHANGE UP
SPECIMEN SOURCE: SIGNIFICANT CHANGE UP

## 2025-06-24 DIAGNOSIS — E11.10 TYPE 2 DIABETES MELLITUS WITH KETOACIDOSIS WITHOUT COMA: ICD-10-CM

## 2025-06-24 DIAGNOSIS — E11.43 TYPE 2 DIABETES MELLITUS WITH DIABETIC AUTONOMIC (POLY)NEUROPATHY: ICD-10-CM

## 2025-06-24 DIAGNOSIS — K31.84 GASTROPARESIS: ICD-10-CM

## 2025-06-24 DIAGNOSIS — I10 ESSENTIAL (PRIMARY) HYPERTENSION: ICD-10-CM

## 2025-06-24 DIAGNOSIS — N17.9 ACUTE KIDNEY FAILURE, UNSPECIFIED: ICD-10-CM

## 2025-06-24 DIAGNOSIS — Z79.4 LONG TERM (CURRENT) USE OF INSULIN: ICD-10-CM

## 2025-06-24 DIAGNOSIS — F12.90 CANNABIS USE, UNSPECIFIED, UNCOMPLICATED: ICD-10-CM

## 2025-06-24 DIAGNOSIS — E86.0 DEHYDRATION: ICD-10-CM

## 2025-06-24 DIAGNOSIS — K21.9 GASTRO-ESOPHAGEAL REFLUX DISEASE WITHOUT ESOPHAGITIS: ICD-10-CM
